# Patient Record
(demographics unavailable — no encounter records)

---

## 2024-10-16 NOTE — HISTORY OF PRESENT ILLNESS
[FreeTextEntry1] : 17-year-old female presents with her cousin for evaluation of possible wheelchair and orthopedic evaluation at the recommendation of [place].  She has been treated in  another state, and was seen by podiatry and AFO bracing was recommended.  She presented to the company, [employer] and AFOs were fabricated but they recommended orthopedic evaluation.  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  She complains of lower extremity joint pains including hips ankles and knees.  PT was recommended last year but she was unable to get an appointment.  She states she was also diagnosed recently with a pineal cyst for which neurology is following her.  She has been wearing the AFOs and they help for a period of time but she states she then develops ankle pain after wearing them for a period of time.  She states after her diagnosis of POTS she had weakness in her left lower extremity.  This has improved. [Stable] : stable

## 2024-10-16 NOTE — ASSESSMENT
[FreeTextEntry1] : EDS POTS disease Multiple joint arthralgia Ligamentous laxity  The clinical exam was discussed at length with patient and cousin. She will be evaluated today by the wheelchair program due to difficulty with prolonged walking. She can continue the AFOs if she notices this improves her gait and pain. A course of PT is recommended for generalized lower extremity conditioning program and be taught a home program. She will do this for 4 weeks 2 times per week.    All questions answered. Patient in agreement with the plan. IJennifer, MPAS, PAC, have acted as a scribe and documented the above for Dr. Stewart

## 2024-10-16 NOTE — REVIEW OF SYSTEMS
[Change in Activity] : change in activity [High Blood Pressure] : high blood pressure [Joint Pains] : arthralgias [Back Pain] : ~T back pain [Seizure] : seizures

## 2024-10-16 NOTE — PHYSICAL EXAM
[FreeTextEntry1] : GAIT: Limp noted. FLexed knees when ambulating. Good coordination and balance GENERAL: alert, cooperative pleasant young woman in NAD SKIN: The skin is intact, warm, pink and dry over the area examined. EYES: Normal conjunctiva, normal eyelids and pupils were equal and round. ENT: normal ears, normal nose and normal lips. CARDIOVASCULAR: brisk capillary refill, but no peripheral edema. RESPIRATORY: The patient is in no apparent respiratory distress. They're taking full deep breaths without use of accessory muscles or evidence of audible wheezes or stridor without the use of a stethoscope. Normal respiratory effort. ABDOMEN: not examined   SPIne No asymmetry noted seated LOWER extremity: Genu valgums. Ligamentous laxity noted Hips full flexion and extension. Wide symmetrical abduction. Neg galleazzi. Symmetrical IR and ER. Occasional snapping noted.  Knee: full flexion and extension. No effusion. No tenderness to palpation. No instability to stress PA: 10 degrees Ankle/foot: arch present at rest. No callouses on the feet. DF past neutral. No instability to stress upon standing,hindfoot neutral. Good alignment when standing.  Able to SLR without lag.  Motor strength 5/5, sensation grossly intact, brisk cap refill

## 2024-10-16 NOTE — REASON FOR VISIT
[Initial Evaluation] : an initial evaluation [FreeTextEntry1] : wheelchair evaluation [Patient] : patient [Family Member] : family member

## 2024-10-16 NOTE — HISTORY OF PRESENT ILLNESS
[FreeTextEntry1] : 17-year-old female presents with her cousin for evaluation of possible wheelchair and orthopedic evaluation at the recommendation of [place].  She has been treated in  another state, and was seen by podiatry and AFO bracing was recommended.  She presented to the company, [employer] and AFOs were fabricated but they recommended orthopedic evaluation.  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  She complains of lower extremity joint pains including hips ankles and knees.  PT was recommended last year but she was unable to get an appointment.  She states she was also diagnosed recently with a pineal cyst for which neurology is following her.  She has been wearing the AFOs and they help for a period of time but she states she then develops ankle pain after wearing them for a period of time.  She states after her diagnosis of POTS she had weakness in her left lower extremity.  This has improved. [Stable] : stable